# Patient Record
Sex: MALE | Race: WHITE
[De-identification: names, ages, dates, MRNs, and addresses within clinical notes are randomized per-mention and may not be internally consistent; named-entity substitution may affect disease eponyms.]

---

## 2017-02-15 ENCOUNTER — SURGERY (OUTPATIENT)
Age: 67
End: 2017-02-15

## 2017-02-15 RX ADMIN — FENTANYL CITRATE 100 MCG: 50 INJECTION, SOLUTION INTRAMUSCULAR; INTRAVENOUS at 10:43

## 2017-02-15 RX ADMIN — MIDAZOLAM HYDROCHLORIDE 2 MG: 1 INJECTION, SOLUTION INTRAMUSCULAR; INTRAVENOUS at 10:43

## 2017-07-02 ENCOUNTER — APPOINTMENT (OUTPATIENT)
Dept: GENERAL RADIOLOGY | Facility: CLINIC | Age: 67
End: 2017-07-02
Attending: EMERGENCY MEDICINE
Payer: COMMERCIAL

## 2017-07-02 ENCOUNTER — HOSPITAL ENCOUNTER (EMERGENCY)
Facility: CLINIC | Age: 67
Discharge: HOME | End: 2017-07-02
Attending: EMERGENCY MEDICINE | Admitting: EMERGENCY MEDICINE
Payer: COMMERCIAL

## 2017-07-02 VITALS
BODY MASS INDEX: 27.85 KG/M2 | DIASTOLIC BLOOD PRESSURE: 80 MMHG | SYSTOLIC BLOOD PRESSURE: 154 MMHG | OXYGEN SATURATION: 95 % | WEIGHT: 188 LBS | HEIGHT: 69 IN | TEMPERATURE: 97.8 F | HEART RATE: 68 BPM | RESPIRATION RATE: 18 BRPM

## 2017-07-02 DIAGNOSIS — S42.401A ELBOW FRACTURE, RIGHT, CLOSED, INITIAL ENCOUNTER: ICD-10-CM

## 2017-07-02 DIAGNOSIS — S53.124A: ICD-10-CM

## 2017-07-02 PROCEDURE — 40000986 XR ELBOW RT 2 VW: Mod: RT

## 2017-07-02 PROCEDURE — 73060 X-RAY EXAM OF HUMERUS: CPT | Mod: RT

## 2017-07-02 PROCEDURE — 99152 MOD SED SAME PHYS/QHP 5/>YRS: CPT

## 2017-07-02 PROCEDURE — 73070 X-RAY EXAM OF ELBOW: CPT | Mod: RT

## 2017-07-02 PROCEDURE — 99285 EMERGENCY DEPT VISIT HI MDM: CPT | Mod: 25

## 2017-07-02 PROCEDURE — 24577 CLTX HUMRL CNDYLR FX W/MNPJ: CPT | Mod: RT

## 2017-07-02 PROCEDURE — 25000125 ZZHC RX 250: Performed by: EMERGENCY MEDICINE

## 2017-07-02 PROCEDURE — 25000128 H RX IP 250 OP 636: Performed by: EMERGENCY MEDICINE

## 2017-07-02 PROCEDURE — 25000125 ZZHC RX 250

## 2017-07-02 PROCEDURE — 73090 X-RAY EXAM OF FOREARM: CPT | Mod: RT

## 2017-07-02 RX ORDER — FLUMAZENIL 0.1 MG/ML
0.2 INJECTION, SOLUTION INTRAVENOUS
Status: DISCONTINUED | OUTPATIENT
Start: 2017-07-02 | End: 2017-07-02 | Stop reason: HOSPADM

## 2017-07-02 RX ORDER — PROPOFOL 10 MG/ML
40 INJECTION, EMULSION INTRAVENOUS ONCE
Status: COMPLETED | OUTPATIENT
Start: 2017-07-02 | End: 2017-07-02

## 2017-07-02 RX ORDER — KETAMINE HCL IN 0.9 % NACL 20 MG/2 ML
40 SYRINGE (ML) INTRAVENOUS ONCE
Status: COMPLETED | OUTPATIENT
Start: 2017-07-02 | End: 2017-07-02

## 2017-07-02 RX ORDER — OXYCODONE AND ACETAMINOPHEN 5; 325 MG/1; MG/1
2 TABLET ORAL EVERY 6 HOURS PRN
Qty: 20 TABLET | Refills: 0 | Status: SHIPPED | OUTPATIENT
Start: 2017-07-02

## 2017-07-02 RX ORDER — KETAMINE HYDROCHLORIDE 10 MG/ML
INJECTION, SOLUTION INTRAMUSCULAR; INTRAVENOUS
Status: COMPLETED
Start: 2017-07-02 | End: 2017-07-02

## 2017-07-02 RX ORDER — FENTANYL CITRATE 50 UG/ML
50 INJECTION, SOLUTION INTRAMUSCULAR; INTRAVENOUS
Status: DISCONTINUED | OUTPATIENT
Start: 2017-07-02 | End: 2017-07-02 | Stop reason: HOSPADM

## 2017-07-02 RX ORDER — NALOXONE HYDROCHLORIDE 0.4 MG/ML
.1-.4 INJECTION, SOLUTION INTRAMUSCULAR; INTRAVENOUS; SUBCUTANEOUS
Status: DISCONTINUED | OUTPATIENT
Start: 2017-07-02 | End: 2017-07-02 | Stop reason: HOSPADM

## 2017-07-02 RX ADMIN — KETAMINE HYDROCHLORIDE 40 MG: 10 INJECTION, SOLUTION INTRAMUSCULAR; INTRAVENOUS at 16:28

## 2017-07-02 RX ADMIN — PROPOFOL 40 MG: 10 INJECTION, EMULSION INTRAVENOUS at 16:26

## 2017-07-02 RX ADMIN — FENTANYL CITRATE 50 MCG: 50 INJECTION, SOLUTION INTRAMUSCULAR; INTRAVENOUS at 15:20

## 2017-07-02 RX ADMIN — Medication 40 MG: at 16:28

## 2017-07-02 NOTE — ED NOTES
Bed: ED09  Expected date: 7/2/17  Expected time:   Means of arrival:   Comments:  432} 67M Elbow pain

## 2017-07-02 NOTE — DISCHARGE INSTRUCTIONS
Elbow Dislocation  An elbow dislocation may occur after a fall onto an outstretched arm or in a car accident. When the hand hits a hard surface, the force is sent to the elbow. This tears ligaments and forces the bones out of the joint. Usually no bones are broken. But the nearby nerves and blood vessels can be damaged.    Once the joint is put back in place, it will take about 6 weeks for the ligaments to heal. For simple dislocations, you will use a splint or sling for the first few weeks. You will need range-of-motion exercises or physical therapy early in your recovery. This will prevent the elbow joint from getting stiff. Later, you may need strengthening exercises.  In more severe cases, you may need surgery to realign the joint and repair the torn ligaments or broken bones. Most elbow dislocations heal fully. But there is some risk for arthritis or loss of full range of motion in that joint.  Home care  Follow these guidelines when caring for yourself at home:    Keep your arm elevated to reduce pain and swelling. When sitting or lying down keep your arm above the level of your heart. You can do this by placing your arm on a pillow that rests on your chest or on a pillow at your side. This is most important during the first 2 days (48 hours) after the injury.    Put an ice pack on the injured area. Do this for 20 minutes every 1 to 2 hours the first day. You can make an ice pack by wrapping a plastic bag of ice cubes in a thin towel. As the ice melts, be careful that the cast or splint doesn t get wet. You can put the ice pack inside the sling and directly over the splint or cast. Continue using the ice pack 3 to 4 times a day for the next 2 days. Then use the ice pack as needed to ease pain and swelling.    Keep the splint or cast completely dry at all times. Bathe with your splint or cast out of the water. Protect it with a large plastic bag, rubber-banded at the top end. If a fiberglass splint or cast  gets wet, you can dry it with a hair dryer.    You may use acetaminophen or ibuprofen to control pain, unless another pain medicine was prescribed. If you have chronic liver or kidney disease, talk with your healthcare provider before using these medicines. Also talk with your provider if you ve had a stomach ulcer or GI bleeding.    Don t take part in sports or physical education until your healthcare provider says it s OK to do so.  Follow-up care  Follow up with your healthcare provider in 1 week, or as advised. Ask your provider when to start range-of-motion exercises. These will keep the elbow from getting stiff.  If X-rays were taken, a radiologist will look at them. You will be told of any new findings that may affect your care.  When to seek medical advice  Call your healthcare provider right away if any of these occur:    The plaster splint becomes wet or soft    The fiberglass splint stays wet for more than 24 hours    Tightness or pain in the elbow gets worse    Fingers become swollen, cold, blue, numb, or tingly    You can t move your elbow as much as you could  Date Last Reviewed: 2/17/2015 2000-2017 [employer]. [address] [place] [address]. All rights reserved. This information is not intended as a substitute for professional medical care. Always follow your healthcare professional's instructions.          Elbow Fracture    You have a break (fracture) of one or more bones of your elbow joint. This may be a small crack in the bone. Or it may be a major break, with the broken parts pushed out of position.  This fracture usually takes 4 to 12 weeks to heal, depending on the type. The first step in treatment is with a splint or cast. Severe fractures may need surgery to put the bone fragments back into place.  Home care  Follow these guidelines when caring for yourself at home:    Keep your arm elevated to reduce pain and swelling. When sitting or lying down keep your arm  above the level of your heart. You can do this by placing your arm on a pillow that rests on your chest or on a pillow at your side. This is most important during the first 2 days (48 hours) after the injury.    Put an ice pack on the injured area. Do this for 20 minutes every 1 to 2 hours the first day. You can make an ice pack by wrapping a plastic bag of ice cubes in a thin towel. As the ice melts, be careful that the cast or splint doesn t get wet. You can place the ice pack inside the sling and directly over the splint or cast. Continue to use the ice pack 3 to 4 times a day for the next 2 days. Then use the ice pack as needed to ease pain and swelling.    Keep the splint or cast completely dry at all times. Bathe with your splint or cast out of the water. Protect it with a large plastic bag, rubber-banded at the top end. If a fiberglass splint or cast gets wet, you can dry it with a hair dryer.    You may use acetaminophen or ibuprofen to control pain, unless another pain medicine was prescribed. If you have chronic liver or kidney disease, talk with your healthcare provider before using these medicines. Also talk with your provider if you ve had a stomach ulcer or gastrointestinal bleeding.    Don t put creams or objects under the cast if you have itching.  Follow-up care  Follow up with your healthcare provider in 1 week, or as advised. This is to make sure the bone is healing the way it should. If a splint was put on, it may be changed to a cast during your follow-up visit.  X-rays may be taken. You will be told of any new findings that may affect your care.  When to seek medical advice  Call your healthcare provider right away if any of these occur:    The cast or splint cracks    The plaster cast or splint becomes wet or soft    The fiberglass cast or splint stays wet for more than 24 hours    Tightness or pain under the cast or splint gets worse    Bad odor from the cast or wound fluid stains the  cast    Fingers become swollen, cold, blue, numb, or tingly    You can t move your fingers    Skin around cast becomes red    Fever of 100.4 F (38 C) or higher, or as directed by your healthcare provider   Date Last Reviewed: 2/6/2017 2000-2017 [employer]. [address] [place] [address]. All rights reserved. This information is not intended as a substitute for professional medical care. Always follow your healthcare professional's instructions.

## 2017-07-02 NOTE — ED NOTES
Bed: ST01  Expected date:   Expected time:   Means of arrival:   Comments:  Hold for room 9 reduction

## 2017-07-02 NOTE — ED AVS SNAPSHOT
Emergency Department    [address] [place] [address]    Phone:  [phone]    Fax:  [phone]                                       Bennie Herrera   MRN: [id number]    Department:   Emergency Department   Date of Visit:  7/2/2017           After Visit Summary Signature Page     I have received my discharge instructions, and my questions have been answered. I have discussed any challenges I see with this plan with the nurse or doctor.    ..........................................................................................................................................  Patient/Patient Representative Signature      ..........................................................................................................................................  Patient Representative Print Name and Relationship to Patient    ..................................................               ................................................  Date                                            Time    ..........................................................................................................................................  Reviewed by Signature/Title    ...................................................              ..............................................  Date                                                            Time

## 2017-07-02 NOTE — ED AVS SNAPSHOT
Emergency Department    [address] [facility] [address]    Phone:  [phone]    Fax:  [phone]                                       Bennie Herrera   MRN: [id number]    Department:   Emergency Department   Date of Visit:  7/2/2017           Patient Information     Date Of Birth          1950        Your diagnoses for this visit were:     Posterior dislocation of elbow, right, initial encounter     Elbow fracture, right, closed, initial encounter        You were seen by Dann Nuno MD.      Follow-up Information     Schedule an appointment as soon as possible for a visit with Carl García MD.    Specialty:  Surgery    Why:  for recheck with Orthopedist later this week    Contact information:    [facility]  [address] [facility] [address]  [phone]          Follow up with  Emergency Department.    Specialty:  EMERGENCY MEDICINE    Why:  As needed, If symptoms worsen    Contact information:    [address] [facility] [address] [phone]        Discharge Instructions         Elbow Dislocation  An elbow dislocation may occur after a fall onto an outstretched arm or in a car accident. When the hand hits a hard surface, the force is sent to the elbow. This tears ligaments and forces the bones out of the joint. Usually no bones are broken. But the nearby nerves and blood vessels can be damaged.    Once the joint is put back in place, it will take about 6 weeks for the ligaments to heal. For simple dislocations, you will use a splint or sling for the first few weeks. You will need range-of-motion exercises or physical therapy early in your recovery. This will prevent the elbow joint from getting stiff. Later, you may need strengthening exercises.  In more severe cases, you may need surgery to realign the joint and repair the torn ligaments or broken bones. Most elbow dislocations heal fully. But there is some risk for arthritis or  loss of full range of motion in that joint.  Home care  Follow these guidelines when caring for yourself at home:    Keep your arm elevated to reduce pain and swelling. When sitting or lying down keep your arm above the level of your heart. You can do this by placing your arm on a pillow that rests on your chest or on a pillow at your side. This is most important during the first 2 days (48 hours) after the injury.    Put an ice pack on the injured area. Do this for 20 minutes every 1 to 2 hours the first day. You can make an ice pack by wrapping a plastic bag of ice cubes in a thin towel. As the ice melts, be careful that the cast or splint doesn t get wet. You can put the ice pack inside the sling and directly over the splint or cast. Continue using the ice pack 3 to 4 times a day for the next 2 days. Then use the ice pack as needed to ease pain and swelling.    Keep the splint or cast completely dry at all times. Bathe with your splint or cast out of the water. Protect it with a large plastic bag, rubber-banded at the top end. If a fiberglass splint or cast gets wet, you can dry it with a hair dryer.    You may use acetaminophen or ibuprofen to control pain, unless another pain medicine was prescribed. If you have chronic liver or kidney disease, talk with your healthcare provider before using these medicines. Also talk with your provider if you ve had a stomach ulcer or GI bleeding.    Don t take part in sports or physical education until your healthcare provider says it s OK to do so.  Follow-up care  Follow up with your healthcare provider in 1 week, or as advised. Ask your provider when to start range-of-motion exercises. These will keep the elbow from getting stiff.  If X-rays were taken, a radiologist will look at them. You will be told of any new findings that may affect your care.  When to seek medical advice  Call your healthcare provider right away if any of these occur:    The plaster splint becomes  wet or soft    The fiberglass splint stays wet for more than 24 hours    Tightness or pain in the elbow gets worse    Fingers become swollen, cold, blue, numb, or tingly    You can t move your elbow as much as you could  Date Last Reviewed: 2/17/2015 2000-2017 [employer]. [address] [place] [address], [place] [id number]. All rights reserved. This information is not intended as a substitute for professional medical care. Always follow your healthcare professional's instructions.          Elbow Fracture    You have a break (fracture) of one or more bones of your elbow joint. This may be a small crack in the bone. Or it may be a major break, with the broken parts pushed out of position.  This fracture usually takes 4 to 12 weeks to heal, depending on the type. The first step in treatment is with a splint or cast. Severe fractures may need surgery to put the bone fragments back into place.  Home care  Follow these guidelines when caring for yourself at home:    Keep your arm elevated to reduce pain and swelling. When sitting or lying down keep your arm above the level of your heart. You can do this by placing your arm on a pillow that rests on your chest or on a pillow at your side. This is most important during the first 2 days (48 hours) after the injury.    Put an ice pack on the injured area. Do this for 20 minutes every 1 to 2 hours the first day. You can make an ice pack by wrapping a plastic bag of ice cubes in a thin towel. As the ice melts, be careful that the cast or splint doesn t get wet. You can place the ice pack inside the sling and directly over the splint or cast. Continue to use the ice pack 3 to 4 times a day for the next 2 days. Then use the ice pack as needed to ease pain and swelling.    Keep the splint or cast completely dry at all times. Bathe with your splint or cast out of the water. Protect it with a large plastic bag, rubber-banded at the top end. If a fiberglass splint or cast  gets wet, you can dry it with a hair dryer.    You may use acetaminophen or ibuprofen to control pain, unless another pain medicine was prescribed. If you have chronic liver or kidney disease, talk with your healthcare provider before using these medicines. Also talk with your provider if you ve had a stomach ulcer or gastrointestinal bleeding.    Don t put creams or objects under the cast if you have itching.  Follow-up care  Follow up with your healthcare provider in 1 week, or as advised. This is to make sure the bone is healing the way it should. If a splint was put on, it may be changed to a cast during your follow-up visit.  X-rays may be taken. You will be told of any new findings that may affect your care.  When to seek medical advice  Call your healthcare provider right away if any of these occur:    The cast or splint cracks    The plaster cast or splint becomes wet or soft    The fiberglass cast or splint stays wet for more than 24 hours    Tightness or pain under the cast or splint gets worse    Bad odor from the cast or wound fluid stains the cast    Fingers become swollen, cold, blue, numb, or tingly    You can t move your fingers    Skin around cast becomes red    Fever of 100.4 F (38 C) or higher, or as directed by your healthcare provider   Date Last Reviewed: 2/6/2017 2000-2017 [employer]. [address] [place] [address]. All rights reserved. This information is not intended as a substitute for professional medical care. Always follow your healthcare professional's instructions.          24 Hour Appointment Hotline       To make an appointment at any [facility] (formerly [facility]), call [phone] ([phone]). If you don't have a family doctor or clinic, we will help you find one. [facility] clinics are conveniently located to serve the needs of you and your family.             Review of your medicines      START taking        Dose / Directions Last dose taken     oxyCODONE-acetaminophen 5-325 MG per tablet   Commonly known as:  PERCOCET   Dose:  2 tablet   Quantity:  20 tablet        Take 2 tablets by mouth every 6 hours as needed for moderate to severe pain   Refills:  0          Our records show that you are taking the medicines listed below. If these are incorrect, please call your family doctor or clinic.        Dose / Directions Last dose taken    loratadine 10 MG tablet   Commonly known as:  CLARITIN   Dose:  10 mg        Take 10 mg by mouth daily.   Refills:  0        MULTIVITAL PO        Take  by mouth.   Refills:  0        NAPROXEN SODIUM PO        Take  by mouth.   Refills:  0        SIMVASTATIN PO   Dose:  20 mg        Take 20 mg by mouth.   Refills:  0                Prescriptions were sent or printed at these locations (1 Prescription)                   Other Prescriptions                Printed at Department/Unit printer (1 of 1)         oxyCODONE-acetaminophen (PERCOCET) 5-325 MG per tablet                Procedures and tests performed during your visit     Cardiac Continuous Monitoring    Elbow XR, 2 views, right    End tidal CO2 Monitoring    Humerus XR, G/E 2 views, right    Peripheral IV catheter    Radius/Ulna XR, PA & LAT, right    XR Elbow Right 2 Views      Orders Needing Specimen Collection     None      Pending Results     Date and Time Order Name Status Description    7/2/2017 1635 Elbow XR, 2 views, right Preliminary             Pending Culture Results     No orders found from 6/30/2017 to 7/3/2017.            Pending Results Instructions     If you had any lab results that were not finalized at the time of your Discharge, you can call the ED Lab Result RN at [phone]. You will be contacted by this team for any positive Lab results or changes in treatment. The nurses are available 7 days a week from 10A to 6:30P.  You can leave a message 24 hours per day and they will return your call.        Test Results From Your Hospital Stay        7/2/2017   3:54 PM      Narrative     HUMERUS RIGHT TWO OR MORE VIEWS   7/2/2017 3:07 PM     HISTORY:  Acute pain/swelling after trauma.    FINDINGS: Mild AC arthrosis.        Impression     IMPRESSION: No fracture identified.    ERUM HOFFMAN MD               7/2/2017  3:54 PM      Narrative     ELBOW RIGHT TWO VIEW, FOREARM RIGHT TWO VIEW   7/2/2017 3:09 PM     HISTORY:  Deformity after fall.        Impression     IMPRESSION: Posterior elbow dislocation. There appears to be a  displaced fracture of the radial head. There are 2 additional small  displaced fracture fragments, at least one of which is suspected to  arise from the coronoid process.    ERUM HOFFMAN MD         7/2/2017  3:54 PM      Narrative     ELBOW RIGHT TWO VIEW, FOREARM RIGHT TWO VIEW   7/2/2017 3:09 PM     HISTORY:  Deformity after fall.        Impression     IMPRESSION: Posterior elbow dislocation. There appears to be a  displaced fracture of the radial head. There are 2 additional small  displaced fracture fragments, at least one of which is suspected to  arise from the coronoid process.    ERUM HOFFMAN MD         7/2/2017  5:09 PM      Narrative     ELBOW RIGHT TWO VIEW   7/2/2017 5:05 PM     HISTORY: Post reduction.    COMPARISON: 1449 on same date.        Impression     IMPRESSION: The fracture dislocation at the elbow joint has been  reduced and placed in plaster splint. The plaster obscures bony  detail. The dislocation is reduced. The fracture fragments seen on the  prereduction films are not identified due to plaster.                Clinical Quality Measure: Blood Pressure Screening     Your blood pressure was checked while you were in the emergency department today. The last reading we obtained was  BP: 150/83 . Please read the guidelines below about what these numbers mean and what you should do about them.  If your systolic blood pressure (the top number) is less than 120 and your diastolic blood pressure (the bottom number) is less than 80, then  "your blood pressure is normal. There is nothing more that you need to do about it.  If your systolic blood pressure (the top number) is 120-139 or your diastolic blood pressure (the bottom number) is 80-89, your blood pressure may be higher than it should be. You should have your blood pressure rechecked within a year by a primary care provider.  If your systolic blood pressure (the top number) is 140 or greater or your diastolic blood pressure (the bottom number) is 90 or greater, you may have high blood pressure. High blood pressure is treatable, but if left untreated over time it can put you at risk for heart attack, stroke, or kidney failure. You should have your blood pressure rechecked by a primary care provider within the next 4 weeks.  If your provider in the emergency department today gave you specific instructions to follow-up with your doctor or provider even sooner than that, you should follow that instruction and not wait for up to 4 weeks for your follow-up visit.        Thank you for choosing [facility]       Thank you for choosing [facility] for your care. Our goal is always to provide you with excellent care. Hearing back from our patients is one way we can continue to improve our services. Please take a few minutes to complete the written survey that you may receive in the mail after you visit with us. Thank you!        [employer] Information     [facility] [employer] [facility] lets you send messages to your doctor, view your test results, renew your prescriptions, schedule appointments and more. To sign up, go to [email or web address] . Click on \"Log in\" on the left side of the screen, which will take you to the Welcome page. Then click on \"Sign up Now\" on the right side of the page.     You will be asked to enter the access code listed below, as well as some personal information. Please follow the directions to create your username and password.     Your access code is: [email or web address]  Expires: 9/30/2017  5:40 PM     Your " access code will  in 90 days. If you need help or a new code, please call your [facility] clinic or [phone].        Care EveryWhere ID     This is your Care EveryWhere ID. This could be used by other organizations to access your [facility] medical records  [id number]        Equal Access to Services     GIOVANNY SCOTT : Vandana romoo Sojonny, waaxda luqadaha, qaybta kaalmada seth, hector fermin. So [facility] [phone].    ATENCIÓN: Si habla español, tiene a ching disposición servicios gratuitos de asistencia lingüística. Llame al [phone].    We comply with applicable federal civil rights laws and [place] laws. We do not discriminate on the basis of race, color, national origin, age, disability sex, sexual orientation or gender identity.            After Visit Summary       This is your record. Keep this with you and show to your community pharmacist(s) and doctor(s) at your next visit.
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
## 2018-02-27 ENCOUNTER — TRANSFERRED RECORDS (OUTPATIENT)
Dept: HEALTH INFORMATION MANAGEMENT | Facility: CLINIC | Age: 68
End: 2018-02-27

## 2018-03-06 ENCOUNTER — TRANSFERRED RECORDS (OUTPATIENT)
Dept: HEALTH INFORMATION MANAGEMENT | Facility: CLINIC | Age: 68
End: 2018-03-06

## 2018-07-26 ENCOUNTER — TRANSFERRED RECORDS (OUTPATIENT)
Dept: HEALTH INFORMATION MANAGEMENT | Facility: CLINIC | Age: 68
End: 2018-07-26

## 2019-11-05 ENCOUNTER — HEALTH MAINTENANCE LETTER (OUTPATIENT)
Age: 69
End: 2019-11-05

## 2020-02-16 ENCOUNTER — HEALTH MAINTENANCE LETTER (OUTPATIENT)
Age: 70
End: 2020-02-16

## 2020-11-22 ENCOUNTER — HEALTH MAINTENANCE LETTER (OUTPATIENT)
Age: 70
End: 2020-11-22

## 2021-04-04 ENCOUNTER — HEALTH MAINTENANCE LETTER (OUTPATIENT)
Age: 71
End: 2021-04-04

## 2021-09-19 ENCOUNTER — HEALTH MAINTENANCE LETTER (OUTPATIENT)
Age: 71
End: 2021-09-19

## 2022-05-01 ENCOUNTER — HEALTH MAINTENANCE LETTER (OUTPATIENT)
Age: 72
End: 2022-05-01

## 2022-11-20 ENCOUNTER — HEALTH MAINTENANCE LETTER (OUTPATIENT)
Age: 72
End: 2022-11-20

## 2023-06-02 ENCOUNTER — HEALTH MAINTENANCE LETTER (OUTPATIENT)
Age: 73
End: 2023-06-02